# Patient Record
Sex: FEMALE | Race: WHITE | NOT HISPANIC OR LATINO | URBAN - METROPOLITAN AREA
[De-identification: names, ages, dates, MRNs, and addresses within clinical notes are randomized per-mention and may not be internally consistent; named-entity substitution may affect disease eponyms.]

---

## 2018-09-28 ENCOUNTER — OFFICE VISIT - HEALTHEAST (OUTPATIENT)
Dept: FAMILY MEDICINE | Facility: CLINIC | Age: 33
End: 2018-09-28

## 2018-09-28 DIAGNOSIS — M54.16 LUMBAR RADICULOPATHY: ICD-10-CM

## 2018-09-28 DIAGNOSIS — R03.0 ELEVATED BLOOD PRESSURE READING WITHOUT DIAGNOSIS OF HYPERTENSION: ICD-10-CM

## 2018-09-28 LAB
ALBUMIN SERPL-MCNC: 3.5 G/DL (ref 3.5–5)
ALBUMIN UR-MCNC: NEGATIVE MG/DL
ALP SERPL-CCNC: 97 U/L (ref 45–120)
ALT SERPL W P-5'-P-CCNC: <9 U/L (ref 0–45)
ANION GAP SERPL CALCULATED.3IONS-SCNC: 9 MMOL/L (ref 5–18)
APPEARANCE UR: CLEAR
AST SERPL W P-5'-P-CCNC: 11 U/L (ref 0–40)
ATRIAL RATE - MUSE: 72 BPM
BILIRUB SERPL-MCNC: 0.4 MG/DL (ref 0–1)
BILIRUB UR QL STRIP: NEGATIVE
BUN SERPL-MCNC: 9 MG/DL (ref 8–22)
CALCIUM SERPL-MCNC: 9.3 MG/DL (ref 8.5–10.5)
CHLORIDE BLD-SCNC: 106 MMOL/L (ref 98–107)
CO2 SERPL-SCNC: 27 MMOL/L (ref 22–31)
COLOR UR AUTO: YELLOW
CREAT SERPL-MCNC: 0.67 MG/DL (ref 0.6–1.1)
DIASTOLIC BLOOD PRESSURE - MUSE: NORMAL MMHG
ERYTHROCYTE [DISTWIDTH] IN BLOOD BY AUTOMATED COUNT: 12.1 % (ref 11–14.5)
GFR SERPL CREATININE-BSD FRML MDRD: >60 ML/MIN/1.73M2
GLUCOSE BLD-MCNC: 87 MG/DL (ref 70–125)
GLUCOSE UR STRIP-MCNC: NEGATIVE MG/DL
HCT VFR BLD AUTO: 38.1 % (ref 35–47)
HGB BLD-MCNC: 12.4 G/DL (ref 12–16)
HGB UR QL STRIP: NEGATIVE
INTERPRETATION ECG - MUSE: NORMAL
KETONES UR STRIP-MCNC: NEGATIVE MG/DL
LEUKOCYTE ESTERASE UR QL STRIP: NEGATIVE
MCH RBC QN AUTO: 28.8 PG (ref 27–34)
MCHC RBC AUTO-ENTMCNC: 32.7 G/DL (ref 32–36)
MCV RBC AUTO: 88 FL (ref 80–100)
NITRATE UR QL: NEGATIVE
P AXIS - MUSE: 30 DEGREES
PH UR STRIP: 6 [PH] (ref 5–8)
PLATELET # BLD AUTO: 307 THOU/UL (ref 140–440)
PMV BLD AUTO: 7.9 FL (ref 7–10)
POTASSIUM BLD-SCNC: 4.1 MMOL/L (ref 3.5–5)
PR INTERVAL - MUSE: 166 MS
PROT SERPL-MCNC: 6.3 G/DL (ref 6–8)
QRS DURATION - MUSE: 108 MS
QT - MUSE: 426 MS
QTC - MUSE: 466 MS
R AXIS - MUSE: -20 DEGREES
RBC # BLD AUTO: 4.31 MILL/UL (ref 3.8–5.4)
SODIUM SERPL-SCNC: 142 MMOL/L (ref 136–145)
SP GR UR STRIP: 1.02 (ref 1–1.03)
SYSTOLIC BLOOD PRESSURE - MUSE: NORMAL MMHG
T AXIS - MUSE: 15 DEGREES
UROBILINOGEN UR STRIP-ACNC: NORMAL
VENTRICULAR RATE- MUSE: 72 BPM
WBC: 8.3 THOU/UL (ref 4–11)

## 2018-09-28 ASSESSMENT — MIFFLIN-ST. JEOR: SCORE: 1795.33

## 2018-10-23 ENCOUNTER — COMMUNICATION - HEALTHEAST (OUTPATIENT)
Dept: SCHEDULING | Facility: CLINIC | Age: 33
End: 2018-10-23

## 2018-10-23 DIAGNOSIS — M54.16 LUMBAR RADICULOPATHY: ICD-10-CM

## 2018-11-09 ENCOUNTER — OFFICE VISIT - HEALTHEAST (OUTPATIENT)
Dept: FAMILY MEDICINE | Facility: CLINIC | Age: 33
End: 2018-11-09

## 2018-11-09 DIAGNOSIS — R03.0 ELEVATED BLOOD PRESSURE READING WITHOUT DIAGNOSIS OF HYPERTENSION: ICD-10-CM

## 2018-11-09 DIAGNOSIS — M79.605 PAIN OF LEFT LOWER EXTREMITY: ICD-10-CM

## 2018-11-09 DIAGNOSIS — Z01.818 PREOPERATIVE EXAMINATION: ICD-10-CM

## 2018-11-09 DIAGNOSIS — M51.16 HERNIATION OF LUMBAR INTERVERTEBRAL DISC WITH RADICULOPATHY: ICD-10-CM

## 2018-11-09 LAB
ANION GAP SERPL CALCULATED.3IONS-SCNC: 8 MMOL/L (ref 5–18)
BUN SERPL-MCNC: 11 MG/DL (ref 8–22)
CALCIUM SERPL-MCNC: 9.1 MG/DL (ref 8.5–10.5)
CHLORIDE BLD-SCNC: 105 MMOL/L (ref 98–107)
CO2 SERPL-SCNC: 26 MMOL/L (ref 22–31)
CREAT SERPL-MCNC: 0.62 MG/DL (ref 0.6–1.1)
ERYTHROCYTE [DISTWIDTH] IN BLOOD BY AUTOMATED COUNT: 12.2 % (ref 11–14.5)
GFR SERPL CREATININE-BSD FRML MDRD: >60 ML/MIN/1.73M2
GLUCOSE BLD-MCNC: 90 MG/DL (ref 70–125)
HCG UR QL: NEGATIVE
HCT VFR BLD AUTO: 39.4 % (ref 35–47)
HGB BLD-MCNC: 13 G/DL (ref 12–16)
MCH RBC QN AUTO: 29.2 PG (ref 27–34)
MCHC RBC AUTO-ENTMCNC: 32.9 G/DL (ref 32–36)
MCV RBC AUTO: 89 FL (ref 80–100)
PLATELET # BLD AUTO: 263 THOU/UL (ref 140–440)
PMV BLD AUTO: 7.9 FL (ref 7–10)
POTASSIUM BLD-SCNC: 4.5 MMOL/L (ref 3.5–5)
RBC # BLD AUTO: 4.44 MILL/UL (ref 3.8–5.4)
SODIUM SERPL-SCNC: 139 MMOL/L (ref 136–145)
SP GR UR STRIP: 1.01 (ref 1–1.03)
WBC: 6.1 THOU/UL (ref 4–11)

## 2018-11-09 ASSESSMENT — MIFFLIN-ST. JEOR: SCORE: 1832.98

## 2019-04-12 ENCOUNTER — COMMUNICATION - HEALTHEAST (OUTPATIENT)
Dept: FAMILY MEDICINE | Facility: CLINIC | Age: 34
End: 2019-04-12

## 2019-04-16 ENCOUNTER — TRANSFERRED RECORDS (OUTPATIENT)
Dept: HEALTH INFORMATION MANAGEMENT | Facility: CLINIC | Age: 34
End: 2019-04-16

## 2019-04-17 ENCOUNTER — HOSPITAL ENCOUNTER (OUTPATIENT)
Dept: MRI IMAGING | Facility: CLINIC | Age: 34
Discharge: HOME | End: 2019-04-17
Attending: FAMILY MEDICINE | Admitting: FAMILY MEDICINE
Payer: COMMERCIAL

## 2019-04-17 ENCOUNTER — MEDICAL CORRESPONDENCE (OUTPATIENT)
Dept: MRI IMAGING | Facility: CLINIC | Age: 34
End: 2019-04-17

## 2019-04-17 ENCOUNTER — TRANSFERRED RECORDS (OUTPATIENT)
Dept: HEALTH INFORMATION MANAGEMENT | Facility: CLINIC | Age: 34
End: 2019-04-17

## 2019-04-17 ENCOUNTER — HOSPITAL ENCOUNTER (OUTPATIENT)
Dept: MRI IMAGING | Facility: CLINIC | Age: 34
End: 2019-04-17
Attending: FAMILY MEDICINE
Payer: COMMERCIAL

## 2019-04-17 ENCOUNTER — RECORDS - HEALTHEAST (OUTPATIENT)
Dept: ADMINISTRATIVE | Facility: OTHER | Age: 34
End: 2019-04-17

## 2019-04-17 DIAGNOSIS — H47.10 EDEMA OF OPTIC DISC OF RIGHT EYE: ICD-10-CM

## 2019-04-17 DIAGNOSIS — H47.10 EDEMA OF OPTIC DISC OF LEFT EYE: ICD-10-CM

## 2019-04-17 LAB
RADIOLOGIST FLAGS: NORMAL
RADIOLOGIST FLAGS: NORMAL

## 2019-04-17 PROCEDURE — 70546 MR ANGIOGRAPH HEAD W/O&W/DYE: CPT

## 2019-04-17 PROCEDURE — 25500064 ZZH RX 255 OP 636: Performed by: STUDENT IN AN ORGANIZED HEALTH CARE EDUCATION/TRAINING PROGRAM

## 2019-04-17 PROCEDURE — 70553 MRI BRAIN STEM W/O & W/DYE: CPT

## 2019-04-17 PROCEDURE — A9585 GADOBUTROL INJECTION: HCPCS | Performed by: STUDENT IN AN ORGANIZED HEALTH CARE EDUCATION/TRAINING PROGRAM

## 2019-04-17 RX ORDER — GADOBUTROL 604.72 MG/ML
10 INJECTION INTRAVENOUS ONCE
Status: COMPLETED | OUTPATIENT
Start: 2019-04-17 | End: 2019-04-17

## 2019-04-17 RX ADMIN — GADOBUTROL 10 ML: 604.72 INJECTION INTRAVENOUS at 06:50

## 2019-04-18 ENCOUNTER — TRANSFERRED RECORDS (OUTPATIENT)
Dept: HEALTH INFORMATION MANAGEMENT | Facility: CLINIC | Age: 34
End: 2019-04-18

## 2019-05-03 ENCOUNTER — OFFICE VISIT (OUTPATIENT)
Dept: NEUROLOGY | Facility: CLINIC | Age: 34
End: 2019-05-03
Payer: COMMERCIAL

## 2019-05-03 VITALS
HEIGHT: 69 IN | BODY MASS INDEX: 40.29 KG/M2 | OXYGEN SATURATION: 96 % | SYSTOLIC BLOOD PRESSURE: 144 MMHG | WEIGHT: 272 LBS | RESPIRATION RATE: 16 BRPM | HEART RATE: 72 BPM | TEMPERATURE: 98.3 F | DIASTOLIC BLOOD PRESSURE: 100 MMHG

## 2019-05-03 DIAGNOSIS — R90.89 ABNORMAL FINDING ON MRI OF BRAIN: ICD-10-CM

## 2019-05-03 DIAGNOSIS — R51.9 NEW ONSET OF HEADACHES: ICD-10-CM

## 2019-05-03 DIAGNOSIS — H47.10 PAPILLEDEMA, BOTH EYES: ICD-10-CM

## 2019-05-03 DIAGNOSIS — G93.2 IIH (IDIOPATHIC INTRACRANIAL HYPERTENSION): Primary | ICD-10-CM

## 2019-05-03 LAB
ALBUMIN SERPL-MCNC: 3.4 G/DL (ref 3.4–5)
ALP SERPL-CCNC: 156 U/L (ref 40–150)
ALT SERPL W P-5'-P-CCNC: 49 U/L (ref 0–50)
ANION GAP SERPL CALCULATED.3IONS-SCNC: 4 MMOL/L (ref 3–14)
APTT PPP: 28 SEC (ref 22–37)
AST SERPL W P-5'-P-CCNC: 26 U/L (ref 0–45)
BASOPHILS # BLD AUTO: 0.1 10E9/L (ref 0–0.2)
BASOPHILS NFR BLD AUTO: 0.7 %
BILIRUB SERPL-MCNC: 0.4 MG/DL (ref 0.2–1.3)
BUN SERPL-MCNC: 10 MG/DL (ref 7–30)
CALCIUM SERPL-MCNC: 8.8 MG/DL (ref 8.5–10.1)
CHLORIDE SERPL-SCNC: 106 MMOL/L (ref 94–109)
CO2 SERPL-SCNC: 28 MMOL/L (ref 20–32)
CREAT SERPL-MCNC: 0.62 MG/DL (ref 0.52–1.04)
DIFFERENTIAL METHOD BLD: NORMAL
EOSINOPHIL # BLD AUTO: 0.2 10E9/L (ref 0–0.7)
EOSINOPHIL NFR BLD AUTO: 2.3 %
ERYTHROCYTE [DISTWIDTH] IN BLOOD BY AUTOMATED COUNT: 13.9 % (ref 10–15)
GFR SERPL CREATININE-BSD FRML MDRD: >90 ML/MIN/{1.73_M2}
GLUCOSE SERPL-MCNC: 98 MG/DL (ref 70–99)
HCT VFR BLD AUTO: 39.6 % (ref 35–47)
HGB BLD-MCNC: 12.6 G/DL (ref 11.7–15.7)
IMM GRANULOCYTES # BLD: 0 10E9/L (ref 0–0.4)
IMM GRANULOCYTES NFR BLD: 0.4 %
INR PPP: 1.06 (ref 0.86–1.14)
LYMPHOCYTES # BLD AUTO: 2.1 10E9/L (ref 0.8–5.3)
LYMPHOCYTES NFR BLD AUTO: 25.6 %
MCH RBC QN AUTO: 27.9 PG (ref 26.5–33)
MCHC RBC AUTO-ENTMCNC: 31.8 G/DL (ref 31.5–36.5)
MCV RBC AUTO: 88 FL (ref 78–100)
MONOCYTES # BLD AUTO: 0.4 10E9/L (ref 0–1.3)
MONOCYTES NFR BLD AUTO: 4.9 %
NEUTROPHILS # BLD AUTO: 5.5 10E9/L (ref 1.6–8.3)
NEUTROPHILS NFR BLD AUTO: 66.1 %
NRBC # BLD AUTO: 0 10*3/UL
NRBC BLD AUTO-RTO: 0 /100
PLATELET # BLD AUTO: 295 10E9/L (ref 150–450)
POTASSIUM SERPL-SCNC: 3.9 MMOL/L (ref 3.4–5.3)
PROT SERPL-MCNC: 7.1 G/DL (ref 6.8–8.8)
RBC # BLD AUTO: 4.52 10E12/L (ref 3.8–5.2)
SODIUM SERPL-SCNC: 137 MMOL/L (ref 133–144)
WBC # BLD AUTO: 8.4 10E9/L (ref 4–11)

## 2019-05-03 RX ORDER — ACETAZOLAMIDE 250 MG/1
TABLET ORAL
Qty: 120 TABLET | Refills: 3 | Status: SHIPPED | OUTPATIENT
Start: 2019-05-03

## 2019-05-03 RX ORDER — ACETAZOLAMIDE 250 MG/1
TABLET ORAL
Qty: 120 TABLET | Refills: 3 | Status: SHIPPED | OUTPATIENT
Start: 2019-05-03 | End: 2019-05-03

## 2019-05-03 ASSESSMENT — MIFFLIN-ST. JEOR: SCORE: 2004.66

## 2019-05-03 ASSESSMENT — ENCOUNTER SYMPTOMS
SMELL DISTURBANCE: 0
HOARSE VOICE: 0
NECK MASS: 0
EYE IRRITATION: 0
SINUS PAIN: 0
SORE THROAT: 0
EYE WATERING: 0
EYE PAIN: 1
TASTE DISTURBANCE: 0
EYE REDNESS: 0
SINUS CONGESTION: 0
TROUBLE SWALLOWING: 0
DOUBLE VISION: 0

## 2019-05-03 ASSESSMENT — PAIN SCALES - GENERAL: PAINLEVEL: MILD PAIN (2)

## 2019-05-03 NOTE — NURSING NOTE
Chief Complaint   Patient presents with     New Patient     [facility] NEW - LUMBAR PUNCTURE, CONSULT      Michelle Garcia

## 2019-05-03 NOTE — PROGRESS NOTES
"Re: Constance Tirado      MRN# [id number]  YOB: 1985  Date of Visit:5/3/2019     OUTPATIENT NEUROLOGY VISIT NOTE    Chief Complaint:  for headaches and possible IIH and LP.     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 Patient was referred for LP.   History of weight gain, s/p left L4-5 hemilaminectomy and microdiskectomy and helped with left leg pain but chronic pain and recovery post surgery difficulties, elevated blood pressure  Patient reports \"pressure\" behind left eye predominately with onset about one month ago. Patient reports that she has started getting severe headaches starting last week but no treatment was initiated. Patient reports that headaches are in the front/center and pulsating almost. No history of headaches or migraines. Patient reports that headaches have been effecting her sleep and radiating pain behind her eyes. Patient reports that her vision changes with seeing \"brighter\" colors in one eye than other. Headaches about 3-4/10 on the numeric pain scale. Patient denies any light or noise sensitivity. No nausea or vomiting. Headaches are not triggered with activity or Valsalva. However headaches are worse with laying down.   Denies chills or fever.   Patient reports that she has been gaining weight due to inactivity and about at least 50 lbs since September of 2018.   Patient is not on birth control, sexual active on rare occasion and lives with her BF. Patient denies any planned or pregnancy currently.  Periods are regular and at baseline.   Ophthalmology evaluation on 4/18/2019 at [facility] and notes reviewed  \"central 24-2 test performed. left eye visual field borderline and papilledema reported    Brain MRI and MRV on 4/17/2019 and results reviewed  \"Impression:  1. Partially empty sella, dilatation of the optic nerve " "sheaths  bilaterally and focal stenosis of the lateral transverse sinuses,  which is suspicious for idiopathic intracranial hypertension. Lumbar  puncture is recommended for confirmation .  2. Head MRA demonstrates no definite aneurysm or stenosis of the major  intracranial arteries.  3. Head MRV demonstrates diminutive right vein of Tatiana and the left  vein of Trolard.     [Consider Follow Up: Findings suggestive of idiopathic intracranial  hypertension]    Past Medical History reviewed and verified with the patient  Chronic back pain    Past Surgical History reviewed and verified with the patient  Lower back surgery Nov-Dec 2018  s/p left L4-5 hemilaminectomy and microdiskectomy   Family History reviewed and verified with the patient  No neurological illnesses  Social History:  Student at the [employer] in [employer]   Social History     Tobacco Use     Smoking status: Never Smoker     Smokeless tobacco: Never Used   Substance Use Topics     Alcohol use: Yes     Comment: occ    reviewed and verified with the patient   No Known Allergies    Current Outpatient Medications   Medication Sig Dispense Refill     Acetaminophen (TYLENOL PO) Take 650 mg by mouth       FLUVOXAMINE MALEATE PO Take 50 mg by mouth       Ibuprofen (ADVIL PO) Take 800 mg by mouth     reviewed and verified with the patient    Review of Systems:  A 12-point ROS including constitutional, eyes, ENT, respiratory, cardiovascular, gastroenterology, genitourinary, integumentary, musculoskeletal, neurology, hematology and psychiatric were all reviewed with the patient and completed at the [facility] Question nary and as mentioned in the HPI.     General Exam:   BP (!) 144/100 (BP Location: Left arm, Patient Position: Sitting, Cuff Size: Adult Large)   Pulse 72   Temp 98.3  F (36.8  C) (Oral)   Resp 16   Ht 1.747 m (5' 8.78\")   Wt 123.4 kg (272 lb)   SpO2 96%   BMI 40.42 kg/m    GEN: Awake, NAD; good eye contact, responses " appropriately, headache today  HEENT: Head atraumatic/Normocephalic. Scalp normal. Pupils equally round, 4 mm, reactive to light and accommodation, sclera and conjunctiva normal. Fundoscopic examination reveals papilledema bilaterally.   Neck: Easily moveable without resistance  Heart: S1/S2 appreciated, RRR, no m/r/g, no carotid bruits  Lungs:Lungs are clear to auscultation bilaterally, no wheezes or crackles.   Neurological Examination:  The patient is alert and oriented times four. Has good attention and concentration. Speech is fluent without dysarthria.   Cranial nerves:  CN I deferred.   CN II: Intact and full visual fields to confrontation bilaterally. CN III, IV, VI: EOM intact. There is no nystagmus. Has conjugated gaze. Intact direct and consensual pupillary light reflexes.   CN V: Intact and symmetrical to facial sensation in the V1 through V3 bilaterally.   CN VII: Intact and symmetrical eyebrow and lid raise and eyelid closure, smiles and frown.   CN VIII: Intact to finger rub bilaterally.   CN IX and X: The palates elevates symmetrical. The uvula is midline.   CN XII: The tongue protrudes midline with no atrophy or fasciculations.   Motor exam: The patient has a normal bulk and tone throughout. There is no atrophy, fasciculations, clonus, or abnormal movements appreciated.   Strength Exam:  5/5 strength at shoulder abduction, elbow flexion or extension, wrist flexion or extension, finger abduction, , hip flexion and extension, knee flexion and extension, and dorsiflexion and plantarflexion bilaterally.   Sensation is intact to light touch and pinprick throughout. Has good vibration and proprioception sensation at great toes bilaterally.   Reflexes are 2+ and symmetrical at biceps, triceps, brachioradialis, patellar, and Achilles.   Negative Babinski with downgoing toes bilaterally.   Coordination reveals finger-nose-finger, rapid alternating movements, finger tapping, and toe tapping with normal  "speed and accuracy.   Station and gait is normal. There is no ataxia. Can walk on the toes, heels, and tandem walk without difficulty. Has good postural reflexes.Has no drift and a negative Romberg. 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 History of back surgery and recent weight gain. Clinical symptoms, exam findings of papilledema and brain MRI findings of \"Partially empty sella, dilatation of the optic nerve sheaths bilaterally and focal stenosis of the lateral transverse sinuses\" consistent with IIH.   Plan discussed with the patient:  LP for opening pressure and CSF study-glucose, cell count with diff, protein  Labs-CBC, CMP, INR/PTT   A trial of acetazolamide for headaches as instructed. Call our Clinic with any medication side effects/concerns. See After visit summary with a full description of acetazolamide side effects. Use back up birth control to avoid pregnancy while taking this medication.  Neuro-ophthalmology referral     Prescription for diamox  provided. Correct use and course provided. Expected benefits and typical side effects reviewed. Safety of concomitant medications and interactions reviewed. Patient taught signs and symptoms of adverse reactions and allergies. Patient understands teaching and accepts risks of prescribed medication regimen.    I discussed all my recommendation with Constance Tirado. The patient verbalizes understanding and comfortable with the plan. The patient has our clinic phone number to call with any questions or concerns. All of the patient's questions were answered from the best of my current knowledge.     Thank you for letting me be a part of the treatment team for Constance Tirado      Time spent with pt answering questions, discussing findings, counseling and coordinating care was more than 50% the appointment time, 53  minutes. "         RAVINDER Saunders, CNP  [facility]    Answers for HPI/ROS submitted by the patient on 5/3/2019   General Symptoms: No  Skin Symptoms: No  HENT Symptoms: Yes  EYE SYMPTOMS: Yes  HEART SYMPTOMS: No  LUNG SYMPTOMS: No  INTESTINAL SYMPTOMS: No  URINARY SYMPTOMS: No  GYNECOLOGIC SYMPTOMS: No  BREAST SYMPTOMS: No  SKELETAL SYMPTOMS: No  BLOOD SYMPTOMS: No  NERVOUS SYSTEM SYMPTOMS: No  MENTAL HEALTH SYMPTOMS: No  Ear pain: No  Ear discharge: No  Hearing loss: No  Tinnitus: No  Nosebleeds: No  Congestion: No  Sinus pain: No  Trouble swallowing: No   Voice hoarseness: No  Mouth sores: No  Sore throat: No  Tooth pain: No  Gum tenderness: No  Bleeding gums: No  Change in taste: No  Change in sense of smell: No  Dry mouth: No  Hearing aid used: No  Neck lump: No  Eye pain: Yes  Vision loss: No  Dry eyes: No  Watery eyes: No  Eye bulging: Yes  Double vision: No  Flashing of lights: No  Spots: No  Floaters: No  Redness: No  Crossed eyes: No  Tunnel Vision: No  Yellowing of eyes: No  Eye irritation: No

## 2019-05-03 NOTE — PATIENT INSTRUCTIONS
Plan:  LP and CSF study  Labs-CBC, CMP, INR/PTT   A trial of acetazolamide for headaches as instructed. Call our Clinic with any medication side effects/concerns. See After visit summary with a full description of acetazolamide side effects. Use back up birth control to avoid pregnancy while taking this medication.  Neuro-ophthalmology referral  Follow up after the LP         Patient Education     Acetazolamide tablets  Brand Name: Diamox  What is this medicine?  ACETAZOLAMIDE (a set a ZOLE a mide) is used to treat glaucoma and some seizure disorders. It may be used to treat edema or swelling from heart failure or from other medicines. This medicine is also used to treat and to prevent altitude or mountain sickness.  How should I use this medicine?  Take this medicine by mouth with a glass of water. Follow the directions on the prescription label. Take this medicine with food if it upsets your stomach. Take your doses at regular intervals. Do not take your medicine more often than directed. Do not stop taking except on your doctor's advice.  Talk to your pediatrician regarding the use of this medicine in children. Special care may be needed.  Patients over 65 years old may have a stronger reaction and need a smaller dose.  What side effects may I notice from receiving this medicine?  Side effects that you should report to your doctor or health care professional as soon as possible:    allergic reactions like skin rash, itching or hives, swelling of the face, lips, or tongue    breathing problems    confusion, depression    dark urine    fever    numbness, tingling in hands or feet    redness, blistering, peeling or loosening of the skin, including inside the mouth    ringing in the ears    seizures    unusually weak or tired    yellowing of the eyes or skin  Side effects that usually do not require medical attention (report to your doctor or health care professional if they continue or are bothersome):    change in  taste    diarrhea    headache    loss of appetite    nausea, vomiting    passing urine more often  What may interact with this medicine?  Do not take this medicine with any of the following medications:    methazolamide  This medicine may also interact with the following medications:    aspirin and aspirin-like medicines    cyclosporine    lithium    medicine for diabetes    methenamine    other diuretics    phenytoin    primidone    quinidine    sodium bicarbonate    stimulant medicines like dextroamphetamine  What if I miss a dose?  If you miss a dose, take it as soon as you can. If it is almost time for your next dose, take only that dose. Do not take double or extra doses.  Where should I keep my medicine?  Keep out of the reach of children.  Store at room temperature between 20 and 25 degrees C (68 and 77 degrees F). Throw away any unused medicine after the expiration date.  What should I tell my health care provider before I take this medicine?  They need to know if you have any of these conditions:    diabetes    kidney disease    liver disease    lung disease    an unusual or allergic reaction to acetazolamide, sulfa drugs, other medicines, foods, dyes, or preservatives    pregnant or trying to get pregnant    breast-feeding  What should I watch for while using this medicine?  Visit your doctor or health care professional for regular checks on your progress. You will need blood work done regularly. If you are diabetic, check your blood sugar as directed.  You may need to be on a special diet while taking this medicine. Ask your doctor. Also, ask how many glasses of fluid you need to drink a day. You must not get dehydrated.  You may get drowsy or dizzy. Do not drive, use machinery, or do anything that needs mental alertness until you know how this medicine affects you. Do not stand or sit up quickly, especially if you are an older patient. This reduces the risk of dizzy or fainting spells.  This medicine can  make you more sensitive to the sun. Keep out of the sun. If you cannot avoid being in the sun, wear protective clothing and use sunscreen. Do not use sun lamps or tanning beds/booths.  NOTE:This sheet is a summary. It may not cover all possible information. If you have questions about this medicine, talk to your doctor, pharmacist, or health care provider. Copyright  2018 [employer]

## 2019-05-03 NOTE — LETTER
"5/3/2019       RE: Constance Tirado  [address] [place] [address]     Dear Colleague,    Thank you for referring your patient, Constance Tirado, to the [facility]UROLOGY at [facility]. Please see a copy of my visit note below.    Re: Constance Tirado      MRN# [id number]  YOB: 1985  Date of Visit:5/3/2019     OUTPATIENT NEUROLOGY VISIT NOTE    Chief Complaint:  for headaches and possible IIH and LP.     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 Patient was referred for LP.   History of weight gain, s/p left L4-5 hemilaminectomy and microdiskectomy and helped with left leg pain but chronic pain and recovery post surgery difficulties, elevated blood pressure  Patient reports \"pressure\" behind left eye predominately with onset about one month ago. Patient reports that she has started getting severe headaches starting last week but no treatment was initiated. Patient reports that headaches are in the front/center and pulsating almost. No history of headaches or migraines. Patient reports that headaches have been effecting her sleep and radiating pain behind her eyes. Patient reports that her vision changes with seeing \"brighter\" colors in one eye than other. Headaches about 3-4/10 on the numeric pain scale. Patient denies any light or noise sensitivity. No nausea or vomiting. Headaches are not triggered with activity or Valsalva. However headaches are worse with laying down.   Denies chills or fever.   Patient reports that she has been gaining weight due to inactivity and about at least 50 lbs since September of 2018.   Patient is not on birth control, sexual active on rare occasion and lives with her BF. Patient denies any planned or pregnancy currently.  Periods are regular and at baseline. " "  Ophthalmology evaluation on 4/18/2019 at [facility] and notes reviewed  \"central 24-2 test performed. left eye visual field borderline and papilledema reported    Brain MRI and MRV on 4/17/2019 and results reviewed  \"Impression:  1. Partially empty sella, dilatation of the optic nerve sheaths  bilaterally and focal stenosis of the lateral transverse sinuses,  which is suspicious for idiopathic intracranial hypertension. Lumbar  puncture is recommended for confirmation .  2. Head MRA demonstrates no definite aneurysm or stenosis of the major  intracranial arteries.  3. Head MRV demonstrates diminutive right vein of Tatiana and the left  vein of Trolard.     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 [employer]   Social History     Tobacco Use     Smoking status: Never Smoker     Smokeless tobacco: Never Used   Substance Use Topics     Alcohol use: Yes     Comment: occ    reviewed and verified with the patient   No Known Allergies    Current Outpatient Medications   Medication Sig Dispense Refill     Acetaminophen (TYLENOL PO) Take 650 mg by mouth       FLUVOXAMINE MALEATE PO Take 50 mg by mouth       Ibuprofen (ADVIL PO) Take 800 mg by mouth     reviewed and verified with the patient    Review of Systems:  A 12-point ROS including constitutional, eyes, ENT, respiratory, cardiovascular, gastroenterology, genitourinary, integumentary, musculoskeletal, neurology, hematology and psychiatric were all reviewed with the patient and completed at the [facility] Question nary and as mentioned in the HPI.     General Exam:   BP " "(!) 144/100 (BP Location: Left arm, Patient Position: Sitting, Cuff Size: Adult Large)   Pulse 72   Temp 98.3  F (36.8  C) (Oral)   Resp 16   Ht 1.747 m (5' 8.78\")   Wt 123.4 kg (272 lb)   SpO2 96%   BMI 40.42 kg/m     GEN: Awake, NAD; good eye contact, responses appropriately, headache today  HEENT: Head atraumatic/Normocephalic. Scalp normal. Pupils equally round, 4 mm, reactive to light and accommodation, sclera and conjunctiva normal. Fundoscopic examination reveals papilledema bilaterally.   Neck: Easily moveable without resistance  Heart: S1/S2 appreciated, RRR, no m/r/g, no carotid bruits  Lungs:Lungs are clear to auscultation bilaterally, no wheezes or crackles.   Neurological Examination:  The patient is alert and oriented times four. Has good attention and concentration. Speech is fluent without dysarthria.   Cranial nerves:  CN I deferred.   CN II: Intact and full visual fields to confrontation bilaterally. CN III, IV, VI: EOM intact. There is no nystagmus. Has conjugated gaze. Intact direct and consensual pupillary light reflexes.   CN V: Intact and symmetrical to facial sensation in the V1 through V3 bilaterally.   CN VII: Intact and symmetrical eyebrow and lid raise and eyelid closure, smiles and frown.   CN VIII: Intact to finger rub bilaterally.   CN IX and X: The palates elevates symmetrical. The uvula is midline.   CN XII: The tongue protrudes midline with no atrophy or fasciculations.   Motor exam: The patient has a normal bulk and tone throughout. There is no atrophy, fasciculations, clonus, or abnormal movements appreciated.   Strength Exam:  5/5 strength at shoulder abduction, elbow flexion or extension, wrist flexion or extension, finger abduction, , hip flexion and extension, knee flexion and extension, and dorsiflexion and plantarflexion bilaterally.   Sensation is intact to light touch and pinprick throughout. Has good vibration and proprioception sensation at great toes " "bilaterally.   Reflexes are 2+ and symmetrical at biceps, triceps, brachioradialis, patellar, and Achilles.   Negative Babinski with downgoing toes bilaterally.   Coordination reveals finger-nose-finger, rapid alternating movements, finger tapping, and toe tapping with normal speed and accuracy.   Station and gait is normal. There is no ataxia. Can walk on the toes, heels, and tandem walk without difficulty. Has good postural reflexes.Has no drift and a negative Romberg. 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 History of back surgery and recent weight gain. Clinical symptoms, exam findings of papilledema and brain MRI findings of \"Partially empty sella, dilatation of the optic nerve sheaths bilaterally and focal stenosis of the lateral transverse sinuses\" consistent with IIH.   Plan discussed with the patient:  LP for opening pressure and CSF study-glucose, cell count with diff, protein  Labs-CBC, CMP, INR/PTT   A trial of acetazolamide for headaches as instructed. Call our Clinic with any medication side effects/concerns. See After visit summary with a full description of acetazolamide side effects. Use back up birth control to avoid pregnancy while taking this medication.  Neuro-ophthalmology referral  Prescription for diamox  provided. Correct use and course provided. Expected benefits and typical side effects reviewed. Safety of concomitant medications and interactions reviewed. Patient taught signs and symptoms of adverse reactions and allergies. Patient understands teaching and accepts risks of prescribed medication regimen.    I discussed all my recommendation with Constance Tirado. The patient verbalizes understanding and comfortable with the plan. The patient has our clinic phone number to call with any questions or concerns. All of the patient's questions were answered from the " best of my current knowledge.     Thank you for letting me be a part of the treatment team for Constance Tirado    Time spent with pt answering questions, discussing findings, counseling and coordinating care was more than 50% the appointment time, 53  minutes.     RAVINDER Saunders, [facility]      LP results reviewed  EXAM: XR LUMBAR PUNCTURE SPINAL TAP DIAGNOSTIC  5/10/2019 2:14 PM        History:  Papilledema, both eyes; New onset of headaches; Abnormal  finding on MRI of brain.      PROCEDURE: The patient consented for a lumbar puncture. The benefits  and risks of the procedure were explained to the patient, including  but not limited to worsening headache, hemorrhage, infection, lower  extremity pain, or nerve root injury. The patient was sterilely  prepped and draped with the patient in the supine position, over the  lower back. Under fluoroscopic guidance, the interlaminar spaces were  noted. 1% lidocaine was administered for local anesthetic over the  L2-3 interlaminar space, and a 22 gauge needle was advanced into the  thecal sac under fluoroscopic guidance.  There was initial aspiration  of clear CSF. About 5 cc's total were aspirated.  The needle was  removed. There were no immediate complications associated with the  procedure.   Estimated blood loss during the procedure was less than 5  mL.     Opening Pressure: 35 cm of water  Fluoro time: 0.3 minutes.   Images Obtained: 2                                                                      IMPRESSION: Successful lumbar puncture. Elevated opening pressure.     MARITZA ELENA PA-C      CSF results reviewed     Ref. Range 5/10/2019 14:04   RBC CSF Latest Ref Range: 0 - 2 /uL 1   WBC CSF Latest Ref Range: 0 - 5 /uL 0   Glucose CSF Latest Ref Range: 40 - 70 mg/dL 58   Protein Total CSF Latest Ref Range: 15 - 60 mg/dL 42   Lyme CSF Massiel Latest Ref Range: <=0.99 ZHENG 0.59

## 2019-05-06 ENCOUNTER — MEDICAL CORRESPONDENCE (OUTPATIENT)
Dept: HEALTH INFORMATION MANAGEMENT | Facility: CLINIC | Age: 34
End: 2019-05-06

## 2019-05-06 ENCOUNTER — TRANSFERRED RECORDS (OUTPATIENT)
Dept: HEALTH INFORMATION MANAGEMENT | Facility: CLINIC | Age: 34
End: 2019-05-06

## 2019-05-07 ENCOUNTER — OFFICE VISIT (OUTPATIENT)
Dept: OPHTHALMOLOGY | Facility: CLINIC | Age: 34
End: 2019-05-07
Attending: OPHTHALMOLOGY
Payer: COMMERCIAL

## 2019-05-07 DIAGNOSIS — H47.10 PAPILLEDEMA, BOTH EYES: Primary | ICD-10-CM

## 2019-05-07 PROCEDURE — 92083 EXTENDED VISUAL FIELD XM: CPT | Mod: ZF | Performed by: STUDENT IN AN ORGANIZED HEALTH CARE EDUCATION/TRAINING PROGRAM

## 2019-05-07 PROCEDURE — 92133 CPTRZD OPH DX IMG PST SGM ON: CPT | Mod: ZF | Performed by: STUDENT IN AN ORGANIZED HEALTH CARE EDUCATION/TRAINING PROGRAM

## 2019-05-07 PROCEDURE — G0463 HOSPITAL OUTPT CLINIC VISIT: HCPCS | Mod: ZF

## 2019-05-07 ASSESSMENT — TONOMETRY
OS_IOP_MMHG: 21
IOP_METHOD: TONOPEN
OD_IOP_MMHG: 21

## 2019-05-07 ASSESSMENT — SLIT LAMP EXAM - LIDS
COMMENTS: NORMAL
COMMENTS: NORMAL

## 2019-05-07 ASSESSMENT — CONF VISUAL FIELD
OS_NORMAL: 1
OD_NORMAL: 1
METHOD: COUNTING FINGERS

## 2019-05-07 ASSESSMENT — VISUAL ACUITY
OS_SC: 20/20
METHOD: SNELLEN - LINEAR
OD_SC: 20/20 SLOW

## 2019-05-07 ASSESSMENT — EXTERNAL EXAM - RIGHT EYE: OD_EXAM: NORMAL

## 2019-05-07 ASSESSMENT — EXTERNAL EXAM - LEFT EYE: OS_EXAM: NORMAL

## 2019-05-07 NOTE — NURSING NOTE
Chief Complaint(s) and History of Present Illness(es)     idiopathic intracranial hypertension evaluation               Comments     Pt is here for an evaluation for idiopathic intracranial hypertension (IIH) per Dr. Evelyn Delgado at [facility]. Pt notes with her vision one eye can see colors noticably brighter than the other (RE>LE) x 4-5 weeks. Pt also notes the start of some intermittent double vision monocular and binocular and > at near x the last few weeks. Since being on the Acetazolamide, the eye pain and headaches have decreased. Pt has been Acetazolamide for about 4 days now.     Zarina Acuña, COMT 2:10 PM May 7, 2019

## 2019-05-07 NOTE — PROGRESS NOTES
"HPI  Constance Tirado is a 33 year old female who presents for evaluation of Idiopathic Intracranial Hypertension (IIH), referred from Dr. Evelyn Delgado at [facility].  She feels there is some color discrepency between the two eyes (right eye colors more bright than left).  She has noticed this over the past 4-5 weeks.  She has intermittent double vision, unclear if monocular or binocular over the past few weeks.  She was recently placed on diamox and her headaches have been improved.  She has been on this medication for 4 days. She is currently a student at the [employer].    No whooshing  No TVOs  No associated medications such as tetracyclines  Does have recent weight gain      History reviewed. No pertinent past medical history.Past Ocular History: No history of eye surgery, trauma, or infections.  Family history: No glaucoma, macular degeneration, or retinal detachment.    Eye Medications:  Diamox 250mg twice a day     Brain MRI and MRV on 4/17/2019 and results reviewed  \"Impression:  1. Partially empty sella, dilatation of the optic nerve sheaths  bilaterally and focal stenosis of the lateral transverse sinuses,  which is suspicious for idiopathic intracranial hypertension. Lumbar  puncture is recommended for confirmation .  2. Head MRA demonstrates no definite aneurysm or stenosis of the major  intracranial arteries.  3. Head MRV demonstrates diminutive right vein of Tatiana and the left  vein of [place].        Assessment & Plan     Constance Tirado is a 33 year old female with the following diagnoses:   1. Papilledema, both eyes       Patient likely with Idiopathic Intracranial Hypertension (IIH) - pending LP on Friday which may be affected by diamox use  Visual fields unaffected, color full and good VA  Idiopathic Intracranial Hypertension (IIH) resources provided for patient  Continue diamox at current dose (250mg twice a day)  Follow-up with with Neuro-ophthalmology " in 3 months, repeat oct retinal nerve fiber layer and OVF   Call sooner with visual changes    -----------------------------------------------------------------------------------    Patient disposition:   Return in about 3 months (around 8/7/2019) for Follow Up IIH check, repeat OCT RNFL and OVF., sooner as needed.    Gilbert Berger M.D.  PGY-3, Ophthalmology     Patient evaluated with Dr. Mango Moralez MD    Attending Physician Attestation:  Complete documentation of historical and exam elements from today's encounter can be found in the full encounter summary report (not reduplicated in this progress note).  I personally obtained the chief complaint(s) and history of present illness.  I confirmed and edited as necessary the review of systems, past medical/surgical history, family history, social history, and examination findings as documented by others; and I examined the patient myself.  I personally reviewed the relevant tests, images, and reports as documented above.  I formulated and edited as necessary the assessment and plan and discussed the findings and management plan with the patient and family. - Mango Moralez MD

## 2019-05-10 ENCOUNTER — HOSPITAL ENCOUNTER (OUTPATIENT)
Dept: GENERAL RADIOLOGY | Facility: CLINIC | Age: 34
End: 2019-05-10
Attending: NURSE PRACTITIONER
Payer: COMMERCIAL

## 2019-05-10 ENCOUNTER — HOSPITAL ENCOUNTER (OUTPATIENT)
Facility: CLINIC | Age: 34
Discharge: HOME | End: 2019-05-10
Admitting: PHYSICIAN ASSISTANT
Payer: COMMERCIAL

## 2019-05-10 VITALS
SYSTOLIC BLOOD PRESSURE: 143 MMHG | OXYGEN SATURATION: 98 % | DIASTOLIC BLOOD PRESSURE: 100 MMHG | TEMPERATURE: 98.3 F | HEART RATE: 74 BPM | RESPIRATION RATE: 16 BRPM

## 2019-05-10 DIAGNOSIS — R90.89 ABNORMAL FINDING ON MRI OF BRAIN: ICD-10-CM

## 2019-05-10 DIAGNOSIS — H47.10 PAPILLEDEMA, BOTH EYES: ICD-10-CM

## 2019-05-10 DIAGNOSIS — R51.9 NEW ONSET OF HEADACHES: ICD-10-CM

## 2019-05-10 LAB
APPEARANCE CSF: CLEAR
COLOR CSF: COLORLESS
GLUCOSE CSF-MCNC: 58 MG/DL (ref 40–70)
GRAM STN SPEC: NORMAL
PROT CSF-MCNC: 42 MG/DL (ref 15–60)
RBC # CSF MANUAL: 1 /UL (ref 0–2)
SPECIMEN SOURCE: NORMAL
TUBE # CSF: 3 #
WBC # CSF MANUAL: 0 /UL (ref 0–5)

## 2019-05-10 PROCEDURE — 89050 BODY FLUID CELL COUNT: CPT | Performed by: NURSE PRACTITIONER

## 2019-05-10 PROCEDURE — 86618 LYME DISEASE ANTIBODY: CPT | Performed by: NURSE PRACTITIONER

## 2019-05-10 PROCEDURE — 87015 SPECIMEN INFECT AGNT CONCNTJ: CPT | Performed by: NURSE PRACTITIONER

## 2019-05-10 PROCEDURE — 87205 SMEAR GRAM STAIN: CPT | Performed by: NURSE PRACTITIONER

## 2019-05-10 PROCEDURE — 86592 SYPHILIS TEST NON-TREP QUAL: CPT | Performed by: NURSE PRACTITIONER

## 2019-05-10 PROCEDURE — 82945 GLUCOSE OTHER FLUID: CPT | Performed by: NURSE PRACTITIONER

## 2019-05-10 PROCEDURE — 40000863 ZZH STATISTIC RADIOLOGY XRAY, US, CT, MAR, NM

## 2019-05-10 PROCEDURE — 87070 CULTURE OTHR SPECIMN AEROBIC: CPT | Performed by: NURSE PRACTITIONER

## 2019-05-10 PROCEDURE — 84157 ASSAY OF PROTEIN OTHER: CPT | Performed by: NURSE PRACTITIONER

## 2019-05-10 PROCEDURE — 62270 DX LMBR SPI PNXR: CPT

## 2019-05-10 RX ORDER — DEXTROSE MONOHYDRATE 25 G/50ML
25-50 INJECTION, SOLUTION INTRAVENOUS
Status: DISCONTINUED | OUTPATIENT
Start: 2019-05-10 | End: 2019-05-10

## 2019-05-10 RX ORDER — DEXTROSE MONOHYDRATE 25 G/50ML
25-50 INJECTION, SOLUTION INTRAVENOUS
Status: CANCELLED | OUTPATIENT
Start: 2019-05-10

## 2019-05-10 RX ORDER — DEXTROSE MONOHYDRATE 25 G/50ML
25-50 INJECTION, SOLUTION INTRAVENOUS
Status: DISCONTINUED | OUTPATIENT
Start: 2019-05-10 | End: 2019-05-10 | Stop reason: HOSPADM

## 2019-05-10 RX ORDER — NICOTINE POLACRILEX 4 MG
15-30 LOZENGE BUCCAL
Status: DISCONTINUED | OUTPATIENT
Start: 2019-05-10 | End: 2019-05-10

## 2019-05-10 RX ORDER — NICOTINE POLACRILEX 4 MG
15-30 LOZENGE BUCCAL
Status: DISCONTINUED | OUTPATIENT
Start: 2019-05-10 | End: 2019-05-10 | Stop reason: HOSPADM

## 2019-05-10 RX ORDER — NICOTINE POLACRILEX 4 MG
15-30 LOZENGE BUCCAL
Status: CANCELLED | OUTPATIENT
Start: 2019-05-10

## 2019-05-10 RX ORDER — LIDOCAINE HYDROCHLORIDE 10 MG/ML
3 INJECTION, SOLUTION EPIDURAL; INFILTRATION; INTRACAUDAL; PERINEURAL ONCE
Status: COMPLETED | OUTPATIENT
Start: 2019-05-10 | End: 2019-05-10

## 2019-05-10 RX ADMIN — LIDOCAINE HYDROCHLORIDE 3 ML: 10 INJECTION, SOLUTION EPIDURAL; INFILTRATION; INTRACAUDAL; PERINEURAL at 13:56

## 2019-05-10 NOTE — DISCHARGE INSTRUCTIONS
Lumbar Puncture Discharge Instructions     After you go home:      You may resume your normal diet    Continue to drink at least 8 ounces of fluid every 1-2 hours until bedtime tonight and continue to drink extra fluids for the next 2 days    Caffeinated beverages may help prevent or reduce spinal headaches    Care of Puncture Site:      If there is a bandaid - you may remove it tomorrow morning    You may shower tomorrow    No tub baths, whirlpools or swimming for 48 hours     Activity - to help prevent spinal headache or spinal fluid leakage:      Minimize your activity today. Flat bedrest for 24 hrs is strongly suggested as this will help to prevent a spinal headache.  You can be up to the bathroom and for meals.    Resume normal activities tomorrow.     Avoid strenuous activity for the next 2 days    Do not drive a vehicle until tomorrow morning    Medicines:      You may resume all medications    Resume your Warfarin/Coumadin at your regular dose today. Follow up with your provider to have your INR rechecked    Resume your Platelet Inhibitors and Aspirin tomorrow at your regular dose    For minor pain, you may take Acetaminophen (Tylenol) or Ibuprofen (Advil)            Call the provider who ordered this procedure if:      Your headache becomes worse or is severe. (A minor headache is not unusual)    You have nausea or vomiting    The site is red, swollen, hot or tender    You have chills or a fever greater than 101 F (38 C)    Any questions or concerns    If you have questions call:        [facility] Radiology Dept @ [phone]    The provider who performed your procedure was Dr. Menjivar.

## 2019-05-10 NOTE — PROGRESS NOTES
Back to room post procedure. BP elevated, but states it has been. Will take meds when home. Denies pain. LP site low back D/I. Instructed on restrictions and taking water. Discharge instructions done with pt. States understanding.    1530 Resting. Up to bathroom. Site D/I Taking fluids. Waiting on ride.    1600 Discharged to door #1 with boyfriend

## 2019-05-10 NOTE — PROGRESS NOTES
RADIOLOGY PROCEDURE NOTE  Patient name: Constance Tirado  MRN: [id number]  : 1985    Pre-procedure diagnosis: Papilledema  Post-procedure diagnosis: Same    Procedure Date/Time: May 10, 2019  2:10 PM  Procedure: Lumbar puncture  Estimated blood loss: None  Specimen(s) collected with description: 4 ml clear CSF  The patient tolerated the procedure well with no immediate complications.  Significant findings:35 cm H20 opening pressure    See imaging dictation for procedural details.    Provider name: Candido Harvey  Assistant(s):None

## 2019-05-10 NOTE — LETTER
Constance Tirado  [address] [place] [address]        7/2/2019      Dear Ms. Tirado:    Thank you for allowing me to participate in your care. Your recent test results were reviewed and listed below.      Please continue to follow up with neuro-ophthalmology. Let me know if you have any questions.       Your results are provided below for your review       Results for orders placed or performed during the hospital encounter of 05/10/19   X-ray Lumbar puncture spinal tap    Narrative    EXAM: XR LUMBAR PUNCTURE SPINAL TAP DIAGNOSTIC  5/10/2019 2:14 PM       History:  Papilledema, both eyes; New onset of headaches; Abnormal  finding on MRI of brain.     PROCEDURE: The patient consented for a lumbar puncture. The benefits  and risks of the procedure were explained to the patient, including  but not limited to worsening headache, hemorrhage, infection, lower  extremity pain, or nerve root injury. The patient was sterilely  prepped and draped with the patient in the supine position, over the  lower back. Under fluoroscopic guidance, the interlaminar spaces were  noted. 1% lidocaine was administered for local anesthetic over the  L2-3 interlaminar space, and a 22 gauge needle was advanced into the  thecal sac under fluoroscopic guidance.  There was initial aspiration  of clear CSF. About 5 cc's total were aspirated.  The needle was  removed. There were no immediate complications associated with the  procedure.   Estimated blood loss during the procedure was less than 5  mL.    Opening Pressure: 35 cm of water  Fluoro time: 0.3 minutes.   Images Obtained: 2      Impression    IMPRESSION: Successful lumbar puncture. Elevated opening pressure.    MARITZA ELENA PA-C   Gram stain   Result Value Ref Range    Specimen Description Cerebrospinal fluid     Gram Stain No organisms seen     Gram Stain No WBC's seen     Gram Stain       Quantification of host cells and microbiological organisms was done  on a cytocentrifuged   preparation.     CSF Culture Aerobic Bacterial   Result Value Ref Range    Specimen Description Cerebrospinal fluid     Culture Micro No growth    B [place] CSF:   Result Value Ref Range    Lyme CSF Massiel 0.59 <=0.99 ZHENG   VDRL CSF:   Result Value Ref Range    VDRL CSF Non Reactive Non Reactive   Protein total CSF:   Result Value Ref Range    Protein Total CSF 42 15 - 60 mg/dL   Cell count with differential CSF:   Result Value Ref Range    WBC CSF 0 0 - 5 /uL    RBC CSF 1 0 - 2 /uL    Tube Number 3 #    Color CSF Colorless CLRL^Colorless    Appearance CSF Clear CLER^Clear   Glucose CSF:   Result Value Ref Range    Glucose CSF 58 40 - 70 mg/dL          If you have any further questions or concerns, please do not hesitate to contact us.     Sincerely,    RAVINDER [facility]  NEUROLOGY CLINIC  Phone: [phone]

## 2019-05-11 LAB — B BURGDOR AB CSF IA-ACNC: 0.59 LIV

## 2019-05-12 LAB — VDRL CSF QL: NON REACTIVE

## 2019-05-13 ENCOUNTER — OFFICE VISIT - HEALTHEAST (OUTPATIENT)
Dept: FAMILY MEDICINE | Facility: CLINIC | Age: 34
End: 2019-05-13

## 2019-05-13 ENCOUNTER — COMMUNICATION - HEALTHEAST (OUTPATIENT)
Dept: ADMINISTRATIVE | Facility: CLINIC | Age: 34
End: 2019-05-13

## 2019-05-13 DIAGNOSIS — E66.01 MORBID OBESITY (H): ICD-10-CM

## 2019-05-13 DIAGNOSIS — H47.10 EDEMA OF OPTIC DISC OF LEFT EYE: ICD-10-CM

## 2019-05-13 DIAGNOSIS — M51.16 HERNIATION OF LUMBAR INTERVERTEBRAL DISC WITH RADICULOPATHY: ICD-10-CM

## 2019-05-13 DIAGNOSIS — I10 ESSENTIAL HYPERTENSION: ICD-10-CM

## 2019-05-13 DIAGNOSIS — Z00.00 ROUTINE GENERAL MEDICAL EXAMINATION AT A HEALTH CARE FACILITY: ICD-10-CM

## 2019-05-13 DIAGNOSIS — F42.9 OBSESSIVE-COMPULSIVE DISORDER, UNSPECIFIED TYPE: ICD-10-CM

## 2019-05-13 ASSESSMENT — MIFFLIN-ST. JEOR: SCORE: 1944.64

## 2019-05-13 NOTE — RESULT ENCOUNTER NOTE
Elevated opening pressure and symptoms consistent with IIH. Patient has been taking diamox and saw neuro-ophthalmology. We'll follow up with neuro-ophthalmology.   Please send LP report to Elis and patient. Thank you

## 2019-05-13 NOTE — RESULT ENCOUNTER NOTE
Labs reviewed. No acute findings except slightly elevated Alkaline phosphatase possibly due to new medication. Monitor periodically if remains on diamox.   Please let patient know about the results and sent the result letter. Thank you

## 2019-05-15 LAB
BACTERIA SPEC CULT: NO GROWTH
SPECIMEN SOURCE: NORMAL

## 2019-05-16 NOTE — PROGRESS NOTES
LP results reviewed  EXAM: XR LUMBAR PUNCTURE SPINAL TAP DIAGNOSTIC  5/10/2019 2:14 PM        History:  Papilledema, both eyes; New onset of headaches; Abnormal  finding on MRI of brain.      PROCEDURE: The patient consented for a lumbar puncture. The benefits  and risks of the procedure were explained to the patient, including  but not limited to worsening headache, hemorrhage, infection, lower  extremity pain, or nerve root injury. The patient was sterilely  prepped and draped with the patient in the supine position, over the  lower back. Under fluoroscopic guidance, the interlaminar spaces were  noted. 1% lidocaine was administered for local anesthetic over the  L2-3 interlaminar space, and a 22 gauge needle was advanced into the  thecal sac under fluoroscopic guidance.  There was initial aspiration  of clear CSF. About 5 cc's total were aspirated.  The needle was  removed. There were no immediate complications associated with the  procedure.   Estimated blood loss during the procedure was less than 5  mL.     Opening Pressure: 35 cm of water  Fluoro time: 0.3 minutes.   Images Obtained: 2                                                                      IMPRESSION: Successful lumbar puncture. Elevated opening pressure.     MARITZA ELENA PA-C      CSF results reviewed     Ref. Range 5/10/2019 14:04   RBC CSF Latest Ref Range: 0 - 2 /uL 1   WBC CSF Latest Ref Range: 0 - 5 /uL 0   Glucose CSF Latest Ref Range: 40 - 70 mg/dL 58   Protein Total CSF Latest Ref Range: 15 - 60 mg/dL 42   Lyme CSF Massiel Latest Ref Range: <=0.99 ZHENG 0.59     Answers for HPI/ROS submitted by the patient on 5/3/2019   General Symptoms: No  Skin Symptoms: No  HENT Symptoms: Yes  EYE SYMPTOMS: Yes  HEART SYMPTOMS: No  LUNG SYMPTOMS: No  INTESTINAL SYMPTOMS: No  URINARY SYMPTOMS: No  GYNECOLOGIC SYMPTOMS: No  BREAST SYMPTOMS: No  SKELETAL SYMPTOMS: No  BLOOD SYMPTOMS: No  NERVOUS SYSTEM SYMPTOMS: No  MENTAL HEALTH SYMPTOMS: No  Ear pain:  No  Ear discharge: No  Hearing loss: No  Tinnitus: No  Nosebleeds: No  Congestion: No  Sinus pain: No  Trouble swallowing: No   Voice hoarseness: No  Mouth sores: No  Sore throat: No  Tooth pain: No  Gum tenderness: No  Bleeding gums: No  Change in taste: No  Change in sense of smell: No  Dry mouth: No  Hearing aid used: No  Neck lump: No  Eye pain: Yes  Vision loss: No  Dry eyes: No  Watery eyes: No  Eye bulging: Yes  Double vision: No  Flashing of lights: No  Spots: No  Floaters: No  Redness: No  Crossed eyes: No  Tunnel Vision: No  Yellowing of eyes: No  Eye irritation: No

## 2019-05-22 ENCOUNTER — OFFICE VISIT - HEALTHEAST (OUTPATIENT)
Dept: FAMILY MEDICINE | Facility: CLINIC | Age: 34
End: 2019-05-22

## 2019-05-22 DIAGNOSIS — Z12.4 SCREENING FOR MALIGNANT NEOPLASM OF CERVIX: ICD-10-CM

## 2019-05-22 DIAGNOSIS — E66.01 MORBID OBESITY (H): ICD-10-CM

## 2019-05-22 DIAGNOSIS — I10 ESSENTIAL HYPERTENSION: ICD-10-CM

## 2019-05-22 ASSESSMENT — MIFFLIN-ST. JEOR: SCORE: 1944.64

## 2019-05-23 LAB
HPV SOURCE: NORMAL
HUMAN PAPILLOMA VIRUS 16 DNA: NEGATIVE
HUMAN PAPILLOMA VIRUS 18 DNA: NEGATIVE
HUMAN PAPILLOMA VIRUS FINAL DIAGNOSIS: NORMAL
HUMAN PAPILLOMA VIRUS OTHER HR: NEGATIVE
SPECIMEN DESCRIPTION: NORMAL

## 2019-08-22 ENCOUNTER — OFFICE VISIT - HEALTHEAST (OUTPATIENT)
Dept: FAMILY MEDICINE | Facility: CLINIC | Age: 34
End: 2019-08-22

## 2019-08-22 DIAGNOSIS — I10 ESSENTIAL HYPERTENSION: ICD-10-CM

## 2019-08-22 DIAGNOSIS — E66.01 MORBID OBESITY (H): ICD-10-CM

## 2019-08-22 DIAGNOSIS — J35.8 TONSILLITH: ICD-10-CM

## 2019-08-22 DIAGNOSIS — J39.2 THROAT IRRITATION: ICD-10-CM

## 2019-08-22 DIAGNOSIS — R09.82 PND (POST-NASAL DRIP): ICD-10-CM

## 2019-08-22 ASSESSMENT — MIFFLIN-ST. JEOR: SCORE: 1843.03

## 2019-08-23 ENCOUNTER — RECORDS - HEALTHEAST (OUTPATIENT)
Dept: ADMINISTRATIVE | Facility: OTHER | Age: 34
End: 2019-08-23

## 2020-01-07 DIAGNOSIS — R90.89 ABNORMAL FINDING ON MRI OF BRAIN: ICD-10-CM

## 2020-01-07 DIAGNOSIS — H47.10 PAPILLEDEMA, BOTH EYES: ICD-10-CM

## 2020-01-07 DIAGNOSIS — R51.9 NEW ONSET OF HEADACHES: ICD-10-CM

## 2020-01-07 NOTE — TELEPHONE ENCOUNTER
Rx Authorization:    Requested Medication/ Dose ACETAZOLAMIDE 250MG TABLET    Date last refill ordered: 5/3/19    Quantity ordered: 120    # refills: 3    Date of last clinic visit with ordering provider: 5/3/19    Date of next clinic visit with ordering provider: NO FUTURE APPOINTMENT SCHEDULE    All pertinent protocol data (lab date/result):     Include pertinent information from patients message:

## 2020-01-08 ENCOUNTER — TELEPHONE (OUTPATIENT)
Dept: NEUROLOGY | Facility: CLINIC | Age: 35
End: 2020-01-08

## 2020-01-08 RX ORDER — ACETAZOLAMIDE 250 MG/1
TABLET ORAL
Qty: 120 TABLET | Refills: 3 | OUTPATIENT
Start: 2020-01-08

## 2020-01-08 NOTE — TELEPHONE ENCOUNTER
I called pt on behalf of Domonique CASTELLON. Our office received a refill request for her acetaZOLAMIDE. She has not been seen since May 2019 and has no follow up. I left her a voicemail asking who she has followed up with for her papilledema this year. I passed on that she will need a follow up visit before any refills can be made. I provided clinic phone number if she would like to speak with me further or if she would like to schedule a follow up.     Adelia AMBROSIO

## 2020-05-08 ENCOUNTER — COMMUNICATION - HEALTHEAST (OUTPATIENT)
Dept: FAMILY MEDICINE | Facility: CLINIC | Age: 35
End: 2020-05-08

## 2020-05-08 DIAGNOSIS — I10 ESSENTIAL HYPERTENSION: ICD-10-CM

## 2020-05-14 ENCOUNTER — COMMUNICATION - HEALTHEAST (OUTPATIENT)
Dept: SCHEDULING | Facility: CLINIC | Age: 35
End: 2020-05-14

## 2020-05-18 ENCOUNTER — OFFICE VISIT - HEALTHEAST (OUTPATIENT)
Dept: FAMILY MEDICINE | Facility: CLINIC | Age: 35
End: 2020-05-18

## 2020-05-18 DIAGNOSIS — F42.9 OBSESSIVE-COMPULSIVE DISORDER, UNSPECIFIED TYPE: ICD-10-CM

## 2020-05-18 DIAGNOSIS — E66.01 MORBID OBESITY (H): ICD-10-CM

## 2020-05-18 DIAGNOSIS — I10 ESSENTIAL HYPERTENSION: ICD-10-CM

## 2020-08-09 ENCOUNTER — COMMUNICATION - HEALTHEAST (OUTPATIENT)
Dept: FAMILY MEDICINE | Facility: CLINIC | Age: 35
End: 2020-08-09

## 2020-08-09 DIAGNOSIS — I10 ESSENTIAL HYPERTENSION: ICD-10-CM

## 2021-05-28 NOTE — PROGRESS NOTES
Assessment/Plan:     1. Routine general medical examination at a health care facility  Routine history and physical, updated in EMR.  Patient has a history of a very normal lipid profile, had a recent blood sugar which was also normal.  She defers fasting labs for a future visit.  Immunizations are up-to-date, patient will return for her Pap as she has her menses today.  Plan repeat physical in 1 year.    2. Essential hypertension  Patient will restart lisinopril.  Previously thought related to her back pain, but her back pain has resolved and hypertension remains.  She will follow-up in 1 to 2 weeks for recheck of her blood pressure.  Previous work-up for secondary causes of hypertension returned negative, she was previously well controlled on lisinopril monotherapy at a tiny dose.  She will also work on weight loss and other lifestyle changes.  - lisinopril (PRINIVIL,ZESTRIL) 5 MG tablet; Take 1 tablet (5 mg total) by mouth daily.  Dispense: 90 tablet; Refill: 3    3. Obsessive-compulsive disorder, unspecified type  Doing well in this regard, follows with psychiatry through the [facility] system.    4. Herniation of lumbar intervertebral disc with radiculopathy  No longer having any back pain.    5. Edema of optic disc of left eye  New diagnosis for patient, unclear etiology.  Will control her blood pressure and then she has follow-up with ophthalmology later this month.    6. Morbid obesity  Discussed a healthy, low-carb, low sugar diet and regular cardiovascular exercise.      Subjective:      Neda Tirado is a 33 y.o. female who presents for an annual exam. The patient is sexually active. The patient participates in regular exercise: no. The patient reports that there is not domestic violence in her life.      About 1 month ago felt pressure behind left eye, went to eye doctor who saw edema of the left optic disc.  Had an MRI brain which showed some excess spinal fluid.  Has had a weight  fluctuation recently, thought could be related to elevated blood pressure.  Prescribed acetazolamide.  Has a follow-up appointment scheduled later this month.    Healthy Habits:   Regular Exercise: No  Sunscreen Use: Yes  Healthy Diet: No  Dental Visits Regularly: Yes  Seat Belt: Yes  Sexually active: Yes  Self Breast Exam Monthly:No  Lipid Profile: No  Glucose Screen: Yes      Immunization History   Administered Date(s) Administered     Dtap 01/31/1987, 05/30/1987, 09/18/1987     Hep B, Adult 08/23/2000, 09/28/2000     Hep B, Peds or Adolescent 08/23/2000, 09/28/2000, 03/08/2001     Hep B, historic 08/23/2000, 09/28/2000, 03/08/2001     MMR 02/18/1988, 08/13/1999     Meningococcal MCV4P 08/09/2005     Meningococcal MPSV4, SQ 08/09/2005     OPV,Trivalent,Historic(4168-8012 only) 01/13/1987, 05/30/1987     Td, Adult, Absorbed 08/13/1999     Tdap 08/29/2013, 03/04/2016     Immunization status: up to date and documented.    No exam data present    Gynecologic History  Patient's last menstrual period was 05/12/2019.  Contraception: condoms  Last Pap: 8/20/15. Results were: normal      OB History   No data available       Current Outpatient Medications   Medication Sig Dispense Refill     acetaZOLAMIDE (DIAMOX) 250 MG tablet Take 250 mg by mouth 2 (two) times a day.       fluvoxaMINE (LUVOX) 50 MG tablet Take 50 mg by mouth bedtime.       lisinopril (PRINIVIL,ZESTRIL) 5 MG tablet Take 1 tablet (5 mg total) by mouth daily. 30 tablet 3     oxyCODONE-acetaminophen (PERCOCET/ENDOCET) 5-325 mg per tablet Take 1 tablet by mouth every 8 (eight) hours as needed.       No current facility-administered medications for this visit.      No past medical history on file.  Past Surgical History:   Procedure Laterality Date     PELVIC FRACTURE SURGERY  age 19    fell from a horse     Patient has no known allergies.  Family History   Problem Relation Age of Onset     Bipolar disorder Mother      Schizophrenia Father      Cancer Maternal  Uncle      Stroke Maternal Grandmother      Breast cancer Neg Hx      Colon cancer Neg Hx      Diabetes Neg Hx      Heart disease Neg Hx      Prostate cancer Neg Hx      Social History     Socioeconomic History     Marital status: Single     Spouse name: Not on file     Number of children: Not on file     Years of education: Not on file     Highest education level: Not on file   Occupational History     Not on file   Social Needs     Financial resource strain: Not on file     Food insecurity:     Worry: Not on file     Inability: Not on file     Transportation needs:     Medical: Not on file     Non-medical: Not on file   Tobacco Use     Smoking status: Never Smoker     Smokeless tobacco: Never Used   Substance and Sexual Activity     Alcohol use: Yes     Alcohol/week: 0.0 - 1.2 oz     Drug use: Not on file     Sexual activity: Yes     Partners: Male     Birth control/protection: Condom     Comment: boyfriend   Lifestyle     Physical activity:     Days per week: Not on file     Minutes per session: Not on file     Stress: Not on file   Relationships     Social connections:     Talks on phone: Not on file     Gets together: Not on file     Attends Yazidi service: Not on file     Active member of club or organization: Not on file     Attends meetings of clubs or organizations: Not on file     Relationship status: Not on file     Intimate partner violence:     Fear of current or ex partner: Not on file     Emotionally abused: Not on file     Physically abused: Not on file     Forced sexual activity: Not on file   Other Topics Concern     Not on file   Social History Narrative     Not on file       Review of Systems  General:  Denies problem  Eyes: Denies problem  Ears/Nose/Throat: Denies problem  Cardiovascular: Denies problem  Respiratory:  Denies problem  Gastrointestinal:  Denies problem  Genitourinary: Denies problem  Musculoskeletal:  Denies problem  Skin: Denies problem  Neurologic: Denies  "problem  Psychiatric: OCD, currently well controlled  Endocrine: Denies problem  Heme/Lymphatic: Denies problem   Allergic/Immunologic: Denies problem        Objective:         Vitals:    05/13/19 1518   BP: (!) 134/102   Pulse: 76   Resp: 20   Weight: (!) 264 lb 6.4 oz (119.9 kg)   Height: 5' 7.8\" (1.722 m)     Body mass index is 40.44 kg/m .    Physical Exam:  General Appearance: Alert, cooperative, no distress, appears stated age  Head: Normocephalic, without obvious abnormality, atraumatic  Eyes: PERRL, conjunctiva/corneas clear, EOM's intact  Ears: Normal TM's and external ear canals, both ears  Nose: Nares normal, septum midline, mucosa normal, no drainage  Throat: Lips, mucosa, and tongue normal; teeth and gums normal  Neck: Supple, symmetrical, trachea midline, no adenopathy; thyroid: not enlarged, symmetric, no tenderness/mass/nodules  Back: Symmetric, no curvature, ROM normal, no CVA tenderness  Lungs: Clear to auscultation bilaterally, respirations unlabored  Breasts: No breast masses, tenderness, asymmetry, or nipple discharge  Heart: Regular rate and rhythm, S1 and S2 normal, no murmur, rub, or gallop  Abdomen: Soft, non-tender, bowel sounds active all four quadrants, no masses, no organomegaly  Pelvic:Not examined  Extremities: Extremities normal, atraumatic, no cyanosis or edema  Skin: Skin color, texture, turgor normal, no rashes or lesions  Lymph nodes: Cervical, supraclavicular, and axillary nodes normal  Neurologic: Normal        "

## 2021-05-28 NOTE — TELEPHONE ENCOUNTER
Left message to call back for: Pt to call back on mainline to clarify appointment visit type for today.    Information to relay to patient:  Provider is wondering if the Pt needs a PHY or just a regular OV.     If Pt needs just a regular OV please change the the visit type to OV and change the time duration to 15 mins (not 30 mins).    If Pt states she wants a PHY done today then leave the visit just as is.

## 2021-05-29 NOTE — PROGRESS NOTES
"  Assessment/Plan:       1. Screening for malignant neoplasm of cervix  Routine screening pap smear updated today.  - Gynecologic Cytology (PAP Smear)  - HPV High Risk DNA Cervical    2. Essential hypertension  Well-controlled on a tiny dose of lisinopril.  Encouraged continued lifestyle modification with weight loss, recheck BP in 6 months.    3. Morbid obesity (H)  Discussed a healthy, low-carb, low sugar diet and regular cardiovascular exercise.  Also discussed options in terms of weight loss programs through our medical system.  Patient will consider these options and plans to make significant effort in this regard.  Plan recheck weight in 6 months, sooner if desired.        Subjective:       Neda Tirado is a 33 y.o. female who presents for primarily a routine pap smear as this was unable to be completed at the time of her recent physical.  She was also restarted on lisinopril at her last visit for hypertension, and is here to recheck this today.  She has not had an abnormal pap smear in the past.  She has been feeling well from a hypertension perspective, denies headaches or further blurred vision.        The following portions of the patient's history were reviewed and updated as appropriate: allergies, current medications, past medical history, past social history and problem list.      Current Outpatient Medications:      acetaZOLAMIDE (DIAMOX) 250 MG tablet, Take 250 mg by mouth 2 (two) times a day., Disp: , Rfl:      fluvoxaMINE (LUVOX) 50 MG tablet, Take 50 mg by mouth bedtime., Disp: , Rfl:      lisinopril (PRINIVIL,ZESTRIL) 5 MG tablet, Take 1 tablet (5 mg total) by mouth daily., Disp: 90 tablet, Rfl: 3    Review of Systems   Pertinent items are noted in HPI.      Objective:      /88 (Patient Site: Left Arm, Patient Position: Sitting, Cuff Size: Adult Regular)   Pulse 100   Resp 20   Ht 5' 7.8\" (1.722 m)   Wt (!) 264 lb 6.4 oz (119.9 kg)   LMP 05/12/2019   Breastfeeding? No   BMI " 40.44 kg/m      General appearance: alert, appears stated age and cooperative  Pelvic: cervix normal in appearance, external genitalia normal, no adnexal masses or tenderness, no cervical motion tenderness, rectovaginal septum normal, vagina normal without discharge and uterus difficult to palpate due to body habitus   CV: RRR, no murmur

## 2021-05-31 NOTE — PROGRESS NOTES
Assessment/ Plan:           1. Essential hypertension  Blood pressure is well controlled but her diastolic blood pressure is still on the higher end.  Recommend that she continue on the 5 mg of lisinopril as this may be impacting her blood pressure slightly.  For now would recommend that she continue to take the medication as prescribed and continue her efforts at weight loss with the hope that she will continue to have improvement with her blood pressure and not require ongoing use of blood pressure medication.    2. Throat irritation  Etiology of throat irritation is unclear but it could be secondary to tonsil with some postnasal drainage.  Also may be secondary to her lisinopril as this can cause an allergy with a cough.  She has not had a cough so would recommend continuing to monitor and ruling out other potential causes.  Also discussed potential cause of GERD as throat irritation however she did not have any significant symptoms and does not have any abdominal pain or classic GERD symptoms.  This is still a possibility however.    3. Tonsillith  History of tonsilloliths, this could be causing some of the symptoms of her throat irritation.  If continues to be bothersome or getting worse would recommend evaluation by ENT.    4. PND (post-nasal drip)  Patient also has some postnasal drainage present which may also be contributing to her throat irritation.    5. Morbid obesity (H)  Encourage continue efforts at weight loss.  She has been doing well with these efforts.         Subjective:      Neda Tirado is a 33 y.o. female who presents for concerns of blood pressure medication and wondering if she should remain on her blood pressure medication. She also mentions some concerns of throat irritation. Symptoms have been consistent for 3 months. She describes a pulsating 'pain/ irritation' this is intermittent and close to daily. She denies pain with swallowing. She denies any URI symptoms but has had a  "\"drippy\" nose and perhaps some post nasal drip. She does have tonsil stone history. She is not aware of symptoms of reflux.   She denies any chest pain, shortness of breath or difficulty breathing.  She is otherwise feeling well.  She has been working on weight loss and has successfully lost 20 pounds since her last office visit.  She is feeling good about these changes and is hopeful that she will continue to lose weight.    The following portions of the patient's history were reviewed and updated as appropriate: allergies, current medications and problem list.    Patient Active Problem List   Diagnosis     Obsessive-compulsive disorder     Herniation of lumbar intervertebral disc with radiculopathy     Essential hypertension     Edema of optic disc of left eye     Morbid obesity (H)       Current Outpatient Medications   Medication Sig     acetaZOLAMIDE (DIAMOX) 250 MG tablet Take 250 mg by mouth 2 (two) times a day.     fluvoxaMINE (LUVOX) 50 MG tablet Take 50 mg by mouth bedtime.     lisinopril (PRINIVIL,ZESTRIL) 5 MG tablet Take 1 tablet (5 mg total) by mouth daily.       Review of Systems   A 12 point comprehensive review of systems was negative except as noted.      Objective:      /88   Pulse 60   Resp 16   Ht 5' 7.8\" (1.722 m)   Wt (!) 242 lb (109.8 kg)   BMI 37.01 kg/m      General appearance: alert, appears stated age and cooperative  Head: Normocephalic, without obvious abnormality, atraumatic   Throat: Mild erythremia and postnasal drainage is noted.  Small exudate also present on the posterior aspect of her right tonsil.  Tonsils are also noted.  No other exudate.  No appearance of infection.  No significant change in tooth and emanal noted.  Lungs: clear to auscultation bilaterally  Heart: regular rate and rhythm, S1, S2 normal, no murmur, click, rub or gallop  Extremities: extremities normal, atraumatic, no cyanosis or edema  Pulses: 2+ and symmetric  Neurologic: Grossly normal      Erika M " YENI Gore  9:27 AM

## 2021-06-01 VITALS — BODY MASS INDEX: 35.03 KG/M2 | HEIGHT: 68 IN | WEIGHT: 231.1 LBS

## 2021-06-02 VITALS — WEIGHT: 239.4 LBS | BODY MASS INDEX: 36.28 KG/M2 | HEIGHT: 68 IN

## 2021-06-02 VITALS — BODY MASS INDEX: 40.07 KG/M2 | WEIGHT: 264.4 LBS | HEIGHT: 68 IN

## 2021-06-02 VITALS — WEIGHT: 264.4 LBS | HEIGHT: 68 IN | BODY MASS INDEX: 40.07 KG/M2

## 2021-06-03 VITALS — WEIGHT: 242 LBS | HEIGHT: 68 IN | BODY MASS INDEX: 36.68 KG/M2

## 2021-06-04 VITALS
DIASTOLIC BLOOD PRESSURE: 82 MMHG | WEIGHT: 222 LBS | HEART RATE: 73 BPM | SYSTOLIC BLOOD PRESSURE: 118 MMHG | BODY MASS INDEX: 33.95 KG/M2

## 2021-06-08 NOTE — PROGRESS NOTES
"Neda Tirado is a 34 y.o. female who is being evaluated via a billable video visit.      The patient has been notified of following:     \"This video visit will be conducted via a call between you and your physician/provider. We have found that certain health care needs can be provided without the need for an in-person physical exam.  This service lets us provide the care you need with a video conversation.  If a prescription is necessary we can send it directly to your pharmacy.  If lab work is needed we can place an order for that and you can then stop by our lab to have the test done at a later time.    Video visits are billed at different rates depending on your insurance coverage. Please reach out to your insurance provider with any questions.    If during the course of the call the physician/provider feels a video visit is not appropriate, you will not be charged for this service.\"    Patient has given verbal consent to a Video visit? Yes    Patient would like to receive their AVS by AVS Preference: Farzad.    Patient would like the video invitation sent by: Send to e-mail at: [email or web address]    Will anyone else be joining your video visit? No        Video Start Time: 1:09 PM    Additional provider notes:     Assessment/Plan:       1. Essential hypertension  Blood pressure continues to be well controlled based on patient's home readings.  She continues to lose weight.  Advised her to check periodically as she progresses with her weight loss and update me with these numbers via my chart.  Next time she is in town, she will let me know and we can order labs at that time.    2. Morbid obesity (H)  Patient continues her efforts at weight loss.  She has gotten into a regular exercise routine and has been trying to eat healthy.  Congratulated her efforts.  She is down 20 pounds from the last time we saw her.    3. Obsessive-compulsive disorder, unspecified type  Patient continues to follow with psychiatry " here in [place].  She does this virtually.        Subjective:       Neda Tirado is a 34 y.o. female who presents for a med check.  It has been 1 year since I last saw this patient.  She is currently taking 5 mg of lisinopril for essential hypertension.  She does have a home blood pressure cuff, does not check her blood pressure often, maybe once monthly.  Readings are similar to what she got today.  She feels well in this regard, denies any headaches with blurred vision, denies lower extremity edema.  She has been exercising regularly.  She works virtually from home.  She is able to exercise most mornings.  She continues to follow with a psychiatrist for obsessive-compulsive disorder, here in [place].  She moved to [facility] almost 1 year ago.  She does not have a primary care provider in [place].  She still has family in [place].  She has been feeling well and has no questions or concerns today.    Labs Reviewed:  Lab Results   Component Value Date    WBC 6.1 11/09/2018    HGB 13.0 11/09/2018    HCT 39.4 11/09/2018     11/09/2018    CHOL 151 08/20/2015    TRIG 45 08/20/2015    HDL 53 08/20/2015    ALT <9 09/28/2018    AST 11 09/28/2018     11/09/2018    K 4.5 11/09/2018     11/09/2018    CREATININE 0.62 11/09/2018    BUN 11 11/09/2018    CO2 26 11/09/2018    TSH 2.20 08/20/2015       The following portions of the patient's history were reviewed and updated as appropriate: allergies, current medications, past medical history, past social history and problem list.      Current Outpatient Medications:      fluvoxaMINE (LUVOX) 50 MG tablet, Take 50 mg by mouth bedtime., Disp: , Rfl:      lisinopriL (PRINIVIL,ZESTRIL) 5 MG tablet, take 1 tablet by mouth once daily, Disp: 90 tablet, Rfl: 0     acetaZOLAMIDE (DIAMOX) 250 MG tablet, Take 250 mg by mouth 2 (two) times a day., Disp: , Rfl:     Review of Systems   Pertinent items are noted in HPI.      Objective:      /82  (Patient Site: Left Arm, Patient Position: Sitting, Cuff Size: Adult Regular)   Pulse 73   Wt 222 lb (100.7 kg) Comment: patient reported  LMP 04/30/2020   Breastfeeding No   BMI 33.95 kg/m      General appearance: alert, appears stated age and cooperative  Head: Normocephalic, without obvious abnormality, atraumatic  Eyes: Conjunctivae clear, sclerae anicteric  Neurologic: Grossly normal, alert and oriented x3, good historian              Video-Visit Details    Type of service:  Video Visit    Video End Time (time video stopped): 1:21 PM  Originating Location (pt. Location): Home    Distant Location (provider location):  [facility]/OB     Platform used for Video Visit: Alejandro Santana MD

## 2021-06-08 NOTE — TELEPHONE ENCOUNTER
Left message to call back for: Pt to call back on mainline to schedule appointment    Information to relay to patient: LVM for Pt to call back to schedule a VV for medication check with Dr. Santana and also Dr. Santana would like Pt to have labs completed as well.    Please notify care team if Pt is schedule for a lab appointment so the provider can put in lab orders.

## 2021-06-08 NOTE — TELEPHONE ENCOUNTER
RN cannot approve Refill Request    RN can NOT refill this medication PCP messaged that patient is overdue for Labs. Last office visit: 5/22/2019 Karen Santana MD Last Physical: 11/9/2018 Last MTM visit: Visit date not found Last visit same specialty: 8/22/2019 Erika Gore CNP.  Next visit within 3 mo: Visit date not found  Next physical within 3 mo: Visit date not found      Ruthy Red, [facility] Triage/Med Refill 5/9/2020    Requested Prescriptions   Pending Prescriptions Disp Refills     lisinopriL (PRINIVIL,ZESTRIL) 5 MG tablet [Pharmacy Med Name: LISINOPRIL 5 MG TABLET] 90 tablet 3     Sig: take 1 tablet by mouth once daily       Ace Inhibitors Refill Protocol Failed - 5/8/2020 11:21 AM        Failed - Serum Potassium in past 12 months     No results found for: LN-POTASSIUM          Failed - Serum Creatinine in past 12 months     Creatinine   Date Value Ref Range Status   11/09/2018 0.62 0.60 - 1.10 mg/dL Final             Passed - PCP or prescribing provider visit in past 12 months       Last office visit with prescriber/PCP: 5/22/2019 Karen Santana MD OR same dept: 8/22/2019 Erika Gore CNP OR same specialty: 8/22/2019 Erika Gore CNP  Last physical: 11/9/2018 Last MTM visit: Visit date not found   Next visit within 3 mo: Visit date not found  Next physical within 3 mo: Visit date not found  Prescriber OR PCP: Karen Santana MD  Last diagnosis associated with med order: 1. Essential hypertension  - lisinopriL (PRINIVIL,ZESTRIL) 5 MG tablet [Pharmacy Med Name: LISINOPRIL 5 MG TABLET]; take 1 tablet by mouth once daily  Dispense: 90 tablet; Refill: 3    If protocol passes may refill for 12 months if within 3 months of last provider visit (or a total of 15 months).             Passed - Blood pressure filed in past 12 months     BP Readings from Last 1 Encounters:   08/22/19 112/88

## 2021-06-10 NOTE — TELEPHONE ENCOUNTER
RN cannot approve Refill Request    RN can NOT refill this medication Protocol failed and NO refill given. Last office visit: 5/22/2019 Karen Santana MD Last Physical: 11/9/2018 Last MTM visit: Visit date not found Last visit same specialty: 8/22/2019 Erika Gore CNP.  Next visit within 3 mo: Visit date not found  Next physical within 3 mo: Visit date not found      Mary Angela, [facility] Triage/Med Refill 8/10/2020    Requested Prescriptions   Pending Prescriptions Disp Refills     lisinopriL (PRINIVIL,ZESTRIL) 5 MG tablet [Pharmacy Med Name: LISINOPRIL 5 MG TABLET] 90 tablet 0     Sig: take 1 tablet by mouth once daily       Ace Inhibitors Refill Protocol Failed - 8/9/2020  5:58 PM        Failed - Serum Potassium in past 12 months     No results found for: LN-POTASSIUM          Failed - Serum Creatinine in past 12 months     Creatinine   Date Value Ref Range Status   11/09/2018 0.62 0.60 - 1.10 mg/dL Final             Passed - PCP or prescribing provider visit in past 12 months       Last office visit with prescriber/PCP: 5/22/2019 Karen Santana MD OR same dept: 8/22/2019 Erika Gore CNP OR same specialty: 8/22/2019 Erika Gore CNP  Last physical: 11/9/2018 Last MTM visit: Visit date not found   Next visit within 3 mo: Visit date not found  Next physical within 3 mo: Visit date not found  Prescriber OR PCP: Karen Santana MD  Last diagnosis associated with med order: 1. Essential hypertension  - lisinopriL (PRINIVIL,ZESTRIL) 5 MG tablet [Pharmacy Med Name: LISINOPRIL 5 MG TABLET]; take 1 tablet by mouth once daily  Dispense: 90 tablet; Refill: 0    If protocol passes may refill for 12 months if within 3 months of last provider visit (or a total of 15 months).             Passed - Blood pressure filed in past 12 months     BP Readings from Last 1 Encounters:   05/18/20 118/82

## 2021-06-16 PROBLEM — I10 ESSENTIAL HYPERTENSION: Status: ACTIVE | Noted: 2018-11-09

## 2021-06-16 PROBLEM — E66.01 MORBID OBESITY (H): Status: ACTIVE | Noted: 2019-05-13

## 2021-06-16 PROBLEM — M51.16 HERNIATION OF LUMBAR INTERVERTEBRAL DISC WITH RADICULOPATHY: Status: ACTIVE | Noted: 2018-10-30

## 2021-06-16 PROBLEM — H47.10 EDEMA OF OPTIC DISC OF LEFT EYE: Status: ACTIVE | Noted: 2019-05-13

## 2021-06-20 NOTE — PROGRESS NOTES
Assessment/Plan:      1. Elevated blood pressure reading without diagnosis of hypertension  Workup completed as below and negative for secondary causes of hypertension.  Because patient cannot receive her epidural steroid injections without having her blood pressure lower, we did agree to start treatment today.  I recommended starting with 5 mg of lisinopril.  She will update me with home blood pressure readings in a couple of weeks and we can adjust from there.  She also received some pain medications from her orthopedist yesterday.  Pain is likely a contributor to her high blood pressure.  She has also gained weight recently related to inactivity due to back pain.  - Comprehensive Metabolic Panel  - HM2(CBC w/o Differential)  - Urinalysis  - Electrocardiogram Perform and Read    2. Lumbar radiculopathy  Patient is following with [facility] orthopedics and has an epidural steroid injection planned if her blood pressure is within normal range.          Subjective:       Neda Tirado is a 33 y.o. female who presents for evaluation of elevated blood pressure.  Patient is following with [facility] orthopedics for lumbar radiculopathy.  She was seen in orthopedic clinic yesterday and had a blood pressure of 174/114.  For this reason, they were not able to perform an epidural steroid injection to relieve her pain.  She has a disc herniation she states.  Patient has been struggling with back pain over the past few months and reports she has had significant weight gain related to inactivity.  She describes her pain at 7/10 currently.  She was prescribed Percocet yesterday but has not been taking this.  She has been having epidural steroid injections to her low back for the past 5 years intermittently.  She is frustrated and looking for a longer term solution.  She has no known family history of hypertension.  Upon review of the electronic medical record, she has gained approximately 20 pounds in the past 2-1/2 years.  She  "states that she \"probably\" eats a lot of salty foods as well.  She denies a lot of swelling in her ankles.  She has no chest pain or shortness of breath.    The following portions of the patient's history were reviewed and updated as appropriate: allergies, current medications, past family history, past medical history, past social history and problem list.      Current Outpatient Prescriptions:      fluvoxaMINE (LUVOX) 50 MG tablet, Take 50 mg by mouth bedtime., Disp: , Rfl:     Review of Systems   A 12 point comprehensive review of systems was negative except as noted.      Objective:      BP (!) 144/98 (Patient Site: Right Arm, Patient Position: Sitting, Cuff Size: Adult Large)  Pulse 80  Resp 20  Ht 5' 7.91\" (1.725 m)  Wt (!) 231 lb 1.6 oz (104.8 kg)  LMP 09/20/2018  Breastfeeding? No  BMI 35.23 kg/m2    General appearance: alert, appears stated age and cooperative  Head: Normocephalic, without obvious abnormality, atraumatic  Eyes: Conjunctivae clear, sclerae anicteric  Lungs: clear to auscultation bilaterally  Heart: regular rate and rhythm, S1, S2 normal, no murmur, click, rub or gallop  Extremities: extremities normal, atraumatic, no cyanosis or edema        Results for orders placed or performed in visit on 09/28/18   Comprehensive Metabolic Panel   Result Value Ref Range    Sodium 142 136 - 145 mmol/L    Potassium 4.1 3.5 - 5.0 mmol/L    Chloride 106 98 - 107 mmol/L    CO2 27 22 - 31 mmol/L    Anion Gap, Calculation 9 5 - 18 mmol/L    Glucose 87 70 - 125 mg/dL    BUN 9 8 - 22 mg/dL    Creatinine 0.67 0.60 - 1.10 mg/dL    GFR MDRD Af Amer >60 >60 mL/min/1.73m2    GFR MDRD Non Af Amer >60 >60 mL/min/1.73m2    Bilirubin, Total 0.4 0.0 - 1.0 mg/dL    Calcium 9.3 8.5 - 10.5 mg/dL    Protein, Total 6.3 6.0 - 8.0 g/dL    Albumin 3.5 3.5 - 5.0 g/dL    Alkaline Phosphatase 97 45 - 120 U/L    AST 11 0 - 40 U/L    ALT <9 0 - 45 U/L   HM2(CBC w/o Differential)   Result Value Ref Range    WBC 8.3 4.0 - 11.0 " thou/uL    RBC 4.31 3.80 - 5.40 mill/uL    Hemoglobin 12.4 12.0 - 16.0 g/dL    Hematocrit 38.1 35.0 - 47.0 %    MCV 88 80 - 100 fL    MCH 28.8 27.0 - 34.0 pg    MCHC 32.7 32.0 - 36.0 g/dL    RDW 12.1 11.0 - 14.5 %    Platelets 307 140 - 440 thou/uL    MPV 7.9 7.0 - 10.0 fL   Urinalysis   Result Value Ref Range    Color, UA Yellow Colorless, Yellow, Straw, Light Yellow    Clarity, UA Clear Clear    Glucose, UA Negative Negative    Bilirubin, UA Negative Negative    Ketones, UA Negative Negative    Specific Gravity, UA 1.020 1.005 - 1.030    Blood, UA Negative Negative    pH, UA 6.0 5.0 - 8.0    Protein, UA Negative Negative mg/dL    Urobilinogen, UA 0.2 E.U./dL 0.2 E.U./dL, 1.0 E.U./dL    Nitrite, UA Negative Negative    Leukocytes, UA Negative Negative   Electrocardiogram Perform and Read   Result Value Ref Range    SYSTOLIC BLOOD PRESSURE  mmHg    DIASTOLIC BLOOD PRESSURE  mmHg    VENTRICULAR RATE 72 BPM    ATRIAL RATE 72 BPM    P-R INTERVAL 166 ms    QRS DURATION 108 ms    Q-T INTERVAL 426 ms    QTC CALCULATION (BEZET) 466 ms    P Axis 30 degrees    R AXIS -20 degrees    T AXIS 15 degrees    MUSE DIAGNOSIS       Normal sinus rhythm  Non-specific intra-ventricular conduction delay  Normal ECG  No previous ECGs available  Confirmed by NGUYỄN BARTON MD LOC:[facility] ([id number]) on 9/28/2018 2:10:42 PM

## 2021-06-21 NOTE — PROGRESS NOTES
Preoperative Exam    Scheduled Procedure: herniated disc repair  Surgery Date:  11/19/18  Surgery Location: [facility]    Surgeon:  Dr. Fischer    Assessment/Plan:     1. Herniation of lumbar intervertebral disc with radiculopathy/Pain of left lower extremity/Preoperative examination  Patient is low risk for complications related to planned procedure.  Would recommend proceed as scheduled without further clinical clarification.  Labs as below are pending at the time of this dictation, will be attached.  Patient also had a normal EKG on 9/28/18 and further workup of elevated blood pressure which was normal.  - Basic Metabolic Panel  - HM2(CBC w/o Differential)  - Pregnancy, Urine    2. Elevated blood pressure reading without diagnosis of hypertension  Patient continues on lisinopril with good control of her blood pressure.  Labs will be updated as below.  Likely this is related to chronic pain.  We will see how patient's blood pressure control is after her upcoming surgery.  - Basic Metabolic Panel  - HM2(CBC w/o Differential)  - Pregnancy, Urine        Surgical Procedure Risk: Intermediate (reported cardiac risk generally 1-5%)  Have you had prior anesthesia?: Yes  Have you or any family members had a previous anesthesia reaction:  No  Do you or any family members have a history of a clotting or bleeding disorder?: No  Cardiac Risk Assessment: no increased risk for major cardiac complications    Patient approved for surgery with general or local anesthesia.        Functional Status: Independent  Patient plans to recover at home with family.     Subjective:      Neda Tirado is a 33 y.o. female who presents for a preoperative consultation.  Patient has had back pain for about 6 years, but this became much more severe since August 2018.  No recent injury.  Has tried PT, epidural steroid injections and injections were becoming less effective.  New MRI shows a larger herniation than previous.  Patient  now recommended to have surgical intervention.  Of note, patient was seen in the emergency department about 1 week ago with severe abdominal pain.  She was found to have constipation was treated with MiraLAX and Colace with complete resolution of her symptoms.    All other systems reviewed and are negative, other than those listed in the HPI.    Pertinent History  Do you have difficulty breathing or chest pain after walking up a flight of stairs: No  History of obstructive sleep apnea: No  Steroid use in the last 6 months: No  Frequent Aspirin/NSAID use: No  Prior Blood Transfusion: No  Prior Blood Transfusion Reaction: No  If for some reason prior to, during or after the procedure, if it is medically indicated, would you be willing to have a blood transfusion?:  There is no transfusion refusal.    Current Outpatient Prescriptions   Medication Sig Dispense Refill     fluvoxaMINE (LUVOX) 50 MG tablet Take 50 mg by mouth bedtime.       lisinopril (PRINIVIL,ZESTRIL) 5 MG tablet Take 1 tablet (5 mg total) by mouth daily. 30 tablet 3     oxyCODONE-acetaminophen (PERCOCET/ENDOCET) 5-325 mg per tablet Take 1 tablet by mouth every 8 (eight) hours as needed.       No current facility-administered medications for this visit.         No Known Allergies    Patient Active Problem List   Diagnosis     Elevated blood pressure reading without diagnosis of hypertension     Obsessive-compulsive disorder     Herniation of lumbar intervertebral disc with radiculopathy     Pain of left lower extremity       No past medical history on file.    Past Surgical History:   Procedure Laterality Date     PELVIC FRACTURE SURGERY  age 19       Social History     Social History     Marital status: Single     Spouse name: N/A     Number of children: N/A     Years of education: N/A     Occupational History     Not on file.     Social History Main Topics     Smoking status: Never Smoker     Smokeless tobacco: Never Used     Alcohol use 0.0 - 1.2  "oz/week     0 - 2 Glasses of wine per week     Drug use: Not on file     Sexual activity: Yes     Partners: Male     Other Topics Concern     Not on file     Social History Narrative       Patient Care Team:  Karen Santana MD as PCP - General (Family Medicine)          Objective:     Vitals:    11/09/18 1355   BP: 116/78   Pulse: 72   Resp: 20   Weight: (!) 239 lb 6.4 oz (108.6 kg)   Height: 5' 7.91\" (1.725 m)         Physical Exam:  /78 (Patient Site: Left Arm, Patient Position: Sitting, Cuff Size: Adult Large)  Pulse 72  Resp 20  Ht 5' 7.91\" (1.725 m)  Wt (!) 239 lb 6.4 oz (108.6 kg)  LMP  (Approximate) Comment: about 4 weeks ago  Breastfeeding? No  BMI 36.5 kg/m2    General Appearance:    Alert, cooperative, no distress, appears stated age   Head:    Normocephalic, without obvious abnormality, atraumatic   Eyes:    PERRL, conjunctiva/corneas clear, EOM's intact both eyes   Ears:    Normal TM's and external ear canals, both ears   Nose:   Nares normal, septum midline, mucosa normal, no drainage     or sinus tenderness   Throat:   Lips, mucosa, and tongue normal; teeth and gums normal   Neck:   Supple, symmetrical, trachea midline, no adenopathy;     thyroid:  no enlargement/tenderness/nodules; no carotid    bruit or JVD   Back:     Symmetric, no curvature, pain left lower back with radiation down the back of the left thigh to the knee   Lungs:     Clear to auscultation bilaterally, respirations unlabored   Chest Wall:    No tenderness or deformity    Heart:    Regular rate and rhythm, S1 and S2 normal, no murmur, rub    or gallop   Breast Exam:    Not performed   Abdomen:     Soft, non-tender, bowel sounds active all four quadrants,     no masses, no organomegaly   Genitalia:    Not examined   Rectal:    Not examined   Extremities:   Extremities normal, atraumatic, no cyanosis or edema   Pulses:   2+ and symmetric all extremities   Skin:   Skin color, texture, turgor normal, no rashes or " lesions   Lymph nodes:   Cervical, supraclavicular, and axillary nodes normal   Neurologic:   CNII-XII intact, normal strength, sensation and reflexes     throughout       There are no Patient Instructions on file for this visit.    EKG: Normal sinus rhythm with a nonspecific intraventricular conduction delay, no ST or T wave changes, no left ventricular hypertrophy (9/28/18)    Labs:  Labs pending at this time.  Results will be reviewed when available.    Immunization History   Administered Date(s) Administered     Dtap 01/31/1987, 05/30/1987, 09/18/1987     Hep B, Adult 08/23/2000, 09/28/2000     Hep B, Peds or Adolescent 08/23/2000, 09/28/2000, 03/08/2001     Hep B, historic 08/23/2000, 09/28/2000, 03/08/2001     MMR 02/18/1988, 08/13/1999     Meningococcal MCV4P 08/09/2005     Meningococcal MPSV4, SQ 08/09/2005     OPV,Trivalent,Historic([id number] only) 01/13/1987, 05/30/1987     Td, Adult, Absorbed 08/13/1999     Tdap 08/29/2013, 03/04/2016           Electronically signed by Karen Santana MD 11/09/18 1:56 PM

## 2021-09-11 ENCOUNTER — HEALTH MAINTENANCE LETTER (OUTPATIENT)
Age: 36
End: 2021-09-11

## 2022-10-30 ENCOUNTER — HEALTH MAINTENANCE LETTER (OUTPATIENT)
Age: 37
End: 2022-10-30

## 2023-11-12 ENCOUNTER — HEALTH MAINTENANCE LETTER (OUTPATIENT)
Age: 38
End: 2023-11-12